# Patient Record
Sex: FEMALE | Race: WHITE | URBAN - METROPOLITAN AREA
[De-identification: names, ages, dates, MRNs, and addresses within clinical notes are randomized per-mention and may not be internally consistent; named-entity substitution may affect disease eponyms.]

---

## 2017-08-01 ENCOUNTER — OFFICE VISIT (OUTPATIENT)
Dept: FAMILY MEDICINE | Facility: CLINIC | Age: 67
End: 2017-08-01
Payer: COMMERCIAL

## 2017-08-01 VITALS
DIASTOLIC BLOOD PRESSURE: 84 MMHG | TEMPERATURE: 97.3 F | WEIGHT: 197.8 LBS | HEART RATE: 90 BPM | HEIGHT: 65 IN | OXYGEN SATURATION: 97 % | BODY MASS INDEX: 32.96 KG/M2 | SYSTOLIC BLOOD PRESSURE: 128 MMHG | RESPIRATION RATE: 16 BRPM

## 2017-08-01 DIAGNOSIS — H61.23 BILATERAL IMPACTED CERUMEN: Primary | ICD-10-CM

## 2017-08-01 PROBLEM — Z12.11 SCREEN FOR COLON CANCER: Status: RESOLVED | Noted: 2017-08-01 | Resolved: 2017-08-01

## 2017-08-01 PROBLEM — Z12.11 SCREEN FOR COLON CANCER: Status: ACTIVE | Noted: 2017-08-01

## 2017-08-01 PROCEDURE — 99213 OFFICE O/P EST LOW 20 MIN: CPT | Performed by: PHYSICIAN ASSISTANT

## 2017-08-01 RX ORDER — ATORVASTATIN CALCIUM 40 MG/1
40 TABLET, FILM COATED ORAL DAILY
COMMUNITY

## 2017-08-01 RX ORDER — GLIPIZIDE 10 MG/1
10 TABLET, FILM COATED, EXTENDED RELEASE ORAL DAILY
COMMUNITY
Start: 2009-09-03

## 2017-08-01 RX ORDER — LISINOPRIL 20 MG/1
20 TABLET ORAL DAILY
COMMUNITY
Start: 2006-08-03

## 2017-08-01 RX ORDER — OXYBUTYNIN CHLORIDE 5 MG/1
TABLET, EXTENDED RELEASE ORAL DAILY
COMMUNITY

## 2017-08-01 ASSESSMENT — PAIN SCALES - GENERAL: PAINLEVEL: NO PAIN (0)

## 2017-08-01 NOTE — MR AVS SNAPSHOT
After Visit Summary   8/1/2017    Zully Torrez    MRN: [id number]           Patient Information     Date Of Birth          1950        Visit Information        Provider Department      8/1/2017 2:00 PM Katia Head PA-C [facility]        Today's Diagnoses     Screen for colon cancer        Asymptomatic postmenopausal status        Visit for screening mammogram        Need for hepatitis C screening test          Care Instructions    HEARING FREQUENCY:   Right Ear:  500 Hz: 30 db HL   1000 Hz: 20 db HL   2000 Hz: 15 db HL   4000 Hz: 30 db HL  Left Ear:  500 Hz: 30 db HL   1000 Hz: 15 db HL   2000 Hz: 10 db HL   4000 Hz: 30 db HL      Based on your medical history and these are the current health maintenance or preventive care services that you are due for (some may have been done at this visit)  Health Maintenance Due   Topic Date Due     TETANUS IMMUNIZATION (SYSTEM ASSIGNED)  10/27/1969     HEPATITIS C SCREENING  10/27/1969     LIPID SCREEN Q5 YR FEMALE (SYSTEM ASSIGNED)  10/27/1996     MAMMO SCREEN Q2 YR (SYSTEM ASSIGNED)  10/27/2001     COLON CANCER SCREEN (SYSTEM ASSIGNED)  10/27/2001     ADVANCE DIRECTIVE PLANNING Q5 YRS  10/27/2006     FALL RISK ASSESSMENT  10/27/2016     DEXA SCAN SCREENING (SYSTEM ASSIGNED)  10/27/2016     PNEUMOCOCCAL (1 of 2 - PCV13) 10/27/2016         At [facility], we strive to deliver an exceptional experience to you, every time we see you.    If you receive a survey in the mail, please send us back your thoughts. We really do value your feedback.    Your care team's suggested websites for health information:  Www.[employer].org : Up to date and easily searchable information on multiple topics.  Www.medlineplus.gov : medication info, interactive tutorials, watch real surgeries online  Www.familydoctor.org : good info from the Academy of Family Physicians  Www.cdc.gov : public health info, travel advisories,  "epidemics (H1N1)  Www.aap.org : children's health info, normal development, vaccinations  Www.health.[facility].[place].us : [place] dept of health, public health issues in [place], N1N1    How to contact your care team:   [facility] [place] [phone]         Pharmacy [phone]    Dr. Alas, Estela Head PA-C, Dr. Marquez, Michaela INTERIANO CNP, Janet Kwok PA-C, Dr. Thomas, and JAYDON Peter CNP    Team RNs: [facility] [place]      Clinic hours  M-Th 7 am-7 pm   Fri 7 am-5 pm.   Urgent care M-F 11 am-9 pm,   Sat/Sun 9 am-5 pm.  Pharmacy M-Th 8 am-8 pm Fri 8 am-6 pm  Sat/Sun 9 am-5 pm.     All password changes, disabled accounts, or ID changes in [facility]/MyHealth will be done by our Access Services Department.    If you need help with your account or password, call: [phone]. Clinic staff no longer has the ability to change passwords.             Follow-ups after your visit        Who to contact     If you have questions or need follow up information about today's clinic visit or your schedule please contact [facility] directly at [phone].  Normal or non-critical lab and imaging results will be communicated to you by MyChart, letter or phone within 4 business days after the clinic has received the results. If you do not hear from us within 7 days, please contact the clinic through [employer] or phone. If you have a critical or abnormal lab result, we will notify you by phone as soon as possible.  Submit refill requests through [facility] or call your pharmacy and they will forward the refill request to us. Please allow 3 business days for your refill to be completed.          Additional Information About Your Visit        [facility] Information     [facility] lets you send messages to your doctor, view your test results, renew your prescriptions, schedule appointments and more. To sign up, go to [email or web address] . Click on \"Log in\" on the left side of the screen, which will take " "you to the Welcome page. Then click on \"Sign up Now\" on the right side of the page.     You will be asked to enter the access code listed below, as well as some personal information. Please follow the directions to create your username and password.     Your access code is: [email or web address]  Expires: 10/30/2017  2:55 PM     Your access code will  in 90 days. If you need help or a new code, please call your [place] clinic or [phone].        Care EveryWhere ID     This is your Care EveryWhere ID. This could be used by other organizations to access your [place] medical records  [id number]        Your Vitals Were     Pulse Temperature Respirations Height Pulse Oximetry BMI (Body Mass Index)    90 97.3  F (36.3  C) (Oral) 16 1.651 m (5' 5\") 97% 32.92 kg/m2       Blood Pressure from Last 3 Encounters:   17 128/84    Weight from Last 3 Encounters:   17 89.7 kg (197 lb 12.8 oz)              Today, you had the following     No orders found for display         Today's Medication Changes          These changes are accurate as of: 17  2:55 PM.  If you have any questions, ask your nurse or doctor.               These medicines have changed or have updated prescriptions.        Dose/Directions    lisinopril 20 MG tablet   Commonly known as:  PRINIVIL/ZESTRIL   This may have changed:  Another medication with the same name was removed. Continue taking this medication, and follow the directions you see here.   Changed by:  Katia Head PA-C        Dose:  20 mg   Take 20 mg by mouth daily   Refills:  0                Primary Care Provider    None Specified       No primary provider on file.        Equal Access to Services     [facility]: Hadii palak Moeller, ciarra prince, viviane de la cruz. So [facility] [phone].    ATENCIÓN: Si habla español, tiene a beck disposición servicios gratuitos de asistencia lingüística. Llame al " [phone].    We comply with applicable federal civil rights laws and [place] laws. We do not discriminate on the basis of race, color, national origin, age, disability sex, sexual orientation or gender identity.            Thank you!     Thank you for choosing [facility]  for your care. Our goal is always to provide you with excellent care. Hearing back from our patients is one way we can continue to improve our services. Please take a few minutes to complete the written survey that you may receive in the mail after your visit with us. Thank you!             Your Updated Medication List - Protect others around you: Learn how to safely use, store and throw away your medicines at www.disposemymeds.org.          This list is accurate as of: 8/1/17  2:55 PM.  Always use your most recent med list.                   Brand Name Dispense Instructions for use Diagnosis    aspirin 81 MG tablet      Take by mouth daily        glipiZIDE 10 MG 24 hr tablet    GLUCOTROL XL     Take 10 mg by mouth daily        LIPITOR 40 MG tablet   Generic drug:  atorvastatin      Take 40 mg by mouth daily        lisinopril 20 MG tablet    PRINIVIL/ZESTRIL     Take 20 mg by mouth daily        oxybutynin 5 MG 24 hr tablet    DITROPAN-XL     Take by mouth daily

## 2017-08-01 NOTE — Clinical Note
Please abstract the following data from this visit with this patient into the appropriate field in Epic:  FIT test 12/1/16 normal by [facility] Mammogram done on this date: 12/1/16 (approximately), by this group: normal, results were [facility].

## 2017-08-01 NOTE — PATIENT INSTRUCTIONS
HEARING FREQUENCY:   Right Ear:  500 Hz: 30 db HL   1000 Hz: 20 db HL   2000 Hz: 15 db HL   4000 Hz: 30 db HL  Left Ear:  500 Hz: 30 db HL   1000 Hz: 15 db HL   2000 Hz: 10 db HL   4000 Hz: 30 db HL      Based on your medical history and these are the current health maintenance or preventive care services that you are due for (some may have been done at this visit)  Health Maintenance Due   Topic Date Due     TETANUS IMMUNIZATION (SYSTEM ASSIGNED)  10/27/1969     HEPATITIS C SCREENING  10/27/1969     LIPID SCREEN Q5 YR FEMALE (SYSTEM ASSIGNED)  10/27/1996     MAMMO SCREEN Q2 YR (SYSTEM ASSIGNED)  10/27/2001     COLON CANCER SCREEN (SYSTEM ASSIGNED)  10/27/2001     ADVANCE DIRECTIVE PLANNING Q5 YRS  10/27/2006     FALL RISK ASSESSMENT  10/27/2016     DEXA SCAN SCREENING (SYSTEM ASSIGNED)  10/27/2016     PNEUMOCOCCAL (1 of 2 - PCV13) 10/27/2016         At [facility], we strive to deliver an exceptional experience to you, every time we see you.    If you receive a survey in the mail, please send us back your thoughts. We really do value your feedback.    Your care team's suggested websites for health information:  Www.[facility] [employer].org : Up to date and easily searchable information on multiple topics.  Www.medlineplus.gov : medication info, interactive tutorials, watch real surgeries online  Www.familydoctor.org : good info from the Academy of Family Physicians  Www.cdc.gov : public health info, travel advisories, epidemics (H1N1)  Www.aap.org : children's health info, normal development, vaccinations  Www.health.[facility].[place].us : [place] dept of health, public health issues in [place], N1N1    How to contact your care team:   Team [place] [phone]         Pharmacy [phone]    Dr. Alas, Estela Head PA-C, Michaela Cowan APRN CNP, Janet Kwok PA-C, Dr. Thomas, and JAYDON Peter CNP    Team RNs: [facility]      Clinic hours  M-Th 7 am-7 pm   Fri 7 am-5 pm.    Urgent care M-F 11 am-9 pm,   Sat/Sun 9 am-5 pm.  Pharmacy M-Th 8 am-8 pm Fri 8 am-6 pm  Sat/Sun 9 am-5 pm.     All password changes, disabled accounts, or ID changes in "[employer]/MyHealth will be done by our Access Services Department.    If you need help with your account or password, call: [phone]. Clinic staff no longer has the ability to change passwords.

## 2017-08-01 NOTE — PROGRESS NOTES
SUBJECTIVE:                                                    Zully Torrez is a 66 year old female who presents to clinic today for the following health issues:      1. Pt states notices hearing issue in large crowd -- pt was told needed to be seen for hearing test. Patient already has an appointment scheduled with ear specialist.    - no family hx of hearing problems      HEARING FREQUENCY:   Right Ear:  500 Hz: 30 db HL   1000 Hz: 20 db HL   2000 Hz: 15 db HL   4000 Hz: 30 db HL  Left Ear:  500 Hz: 30 db HL   1000 Hz: 15 db HL   2000 Hz: 10 db HL   4000 Hz: 30 db HL        Problem list and histories reviewed & adjusted, as indicated.  Additional history: as documented    Patient Active Problem List   Diagnosis   (none) - all problems resolved or deleted     History reviewed. No pertinent surgical history.    Social History   Substance Use Topics     Smoking status: Never Smoker     Smokeless tobacco: Never Used     Alcohol use Yes      Comment: occasional     History reviewed. No pertinent family history.      Current Outpatient Prescriptions   Medication Sig Dispense Refill     oxybutynin (DITROPAN-XL) 5 MG 24 hr tablet Take by mouth daily       atorvastatin (LIPITOR) 40 MG tablet Take 40 mg by mouth daily       glipiZIDE (GLUCOTROL XL) 10 MG 24 hr tablet Take 10 mg by mouth daily       aspirin 81 MG tablet Take by mouth daily       lisinopril (PRINIVIL/ZESTRIL) 20 MG tablet Take 20 mg by mouth daily       [DISCONTINUED] LISINOPRIL PO        Allergies   Allergen Reactions     Contrast Dye          Reviewed and updated as needed this visit by clinical staffTobacco  Allergies  Meds  Med Hx  Surg Hx  Fam Hx  Soc Hx      Reviewed and updated as needed this visit by Provider         ROS:  Constitutional, HEENT, cardiovascular, pulmonary, gi and gu systems are negative, except as otherwise noted.      OBJECTIVE:   /84 (BP Location: Left arm, Patient Position: Left side, Cuff Size: Adult Regular)   "Pulse 90  Temp 97.3  F (36.3  C) (Oral)  Resp 16  Ht 5' 5\" (1.651 m)  Wt 197 lb 12.8 oz (89.7 kg)  SpO2 97%  BMI 32.92 kg/m2  Body mass index is 32.92 kg/(m^2).  GENERAL: healthy, alert and no distress  HENT: normal cephalic/atraumatic and both ears: normal: no effusions, no erythema, normal landmarks and occluded with wax    Diagnostic Test Results:  none     ASSESSMENT/PLAN:       ICD-10-CM    1. Bilateral impacted cerumen H61.23      Removed ear wax with ear lavage and curetting.    Hearing test was normal.     Katia Head PA-C  [facility]    "

## 2017-08-01 NOTE — NURSING NOTE
"Chief Complaint   Patient presents with     Establish Care     Hearing Screening     audiology referral?       Initial /84 (BP Location: Left arm, Patient Position: Left side, Cuff Size: Adult Regular)  Pulse 90  Temp 97.3  F (36.3  C) (Oral)  Resp 16  Ht 1.651 m (5' 5\")  Wt 89.7 kg (197 lb 12.8 oz)  SpO2 97%  BMI 32.92 kg/m2 Estimated body mass index is 32.92 kg/(m^2) as calculated from the following:    Height as of this encounter: 1.651 m (5' 5\").    Weight as of this encounter: 89.7 kg (197 lb 12.8 oz).  Medication Reconciliation: complete     Will Fer PERKINS      "

## 2017-12-28 ENCOUNTER — TELEPHONE (OUTPATIENT)
Dept: FAMILY MEDICINE | Facility: CLINIC | Age: 67
End: 2017-12-28

## 2017-12-28 NOTE — LETTER
December 28, 2017      Zully Torrez  [address] [address]    Dear Zully Torrez,     At [facility] we care about your health and are committed to providing quality patient care.    Which includes staying current on preventive cancer screenings.  You can increase your chances of finding and treating cancers through regular screenings.      Our records indicate you may be due for the following preventive screening(s):    Colonoscopy    Colonoscopy is recommended every ten years for everyone age 50 and older. Please take a moment to read over the enclosed information packet about colon cancer screening. We strongly urge our patient's to consider having a colonoscopy done, which is the best screening test available and only needs to be done every 10 years if normal. If you are unwilling or unable to have a colonoscopy then we recommend the annual stool testing for blood. This test is called a FIT test and it looks for blood in the stool.     To schedule an appointment for your colonoscopy, please see the attached referral.     To schedule an appointment or discuss this screening further, you may contact us by phone at the [facility] at [phone] or online through the patient portal/[email or web address] @ [email or web address]    If you have had any of the screenings listed above at another facility, please call us so that we may update your chart.      Your partners in health,      Quality Committee at [facility]

## 2017-12-28 NOTE — TELEPHONE ENCOUNTER
Panel Management Review        Last Office Visit with this department:     Fail List measure:       Patient is due/failing the following:   COLONOSCOPY    Action needed:   none    Type of outreach:    Sent letter.    Questions for provider review:    None                                                                                                                                    Ashley Combs MA

## 2018-05-01 ENCOUNTER — TELEPHONE (OUTPATIENT)
Dept: FAMILY MEDICINE | Facility: CLINIC | Age: 68
End: 2018-05-01

## 2018-05-01 NOTE — TELEPHONE ENCOUNTER
Panel Management Review      BP Readings from Last 1 Encounters:   08/01/17 128/84      Last Office Visit with this department: 12/28/2017    Fail List measure:       Patient is due/failing the following:   COLONOSCOPY    Action needed:   Patient needs office visit for COLONOSCOPY.    Type of outreach:    Sent letter.    Questions for provider review:    None                                                                                                                                    Diaz Eden, CMA

## 2018-05-01 NOTE — LETTER
May 1, 2018      Zully Torrez  [address] [address]          Dear Zully Torrez,      At [facility] we care about your health and are committed to providing quality patient care, which includes staying current on preventative cancer screenings.  You can increase your chances of finding and treating cancers through regular screenings.      Our records show that you are due for the following screening(s):    Colonoscopy for colon cancer  Specialty Schedule Number [phone]  Recommended every ten years for everyone age 50 and older  We strongly urge our patient's to consider having a colonoscopy done, which is the best screening test available and only needs to be done every 10 years if normal.      Other option is that you can do a FIT and this is once a year.  Any questions or concern, please contact us at [phone].    You may contact the closest location to schedule the screening test(s) at your earliest convenience.    If you have already had one or all of the above screening tests at another facility, please call us so that we may update your chart.      Sincerely,         CLIF Bullock/ DELROY  [facility]